# Patient Record
Sex: MALE | Race: WHITE | Employment: FULL TIME | URBAN - METROPOLITAN AREA
[De-identification: names, ages, dates, MRNs, and addresses within clinical notes are randomized per-mention and may not be internally consistent; named-entity substitution may affect disease eponyms.]

---

## 2023-04-22 ENCOUNTER — APPOINTMENT (OUTPATIENT)
Dept: GENERAL RADIOLOGY | Age: 48
End: 2023-04-22
Payer: COMMERCIAL

## 2023-04-22 ENCOUNTER — HOSPITAL ENCOUNTER (OUTPATIENT)
Age: 48
Setting detail: OBSERVATION
Discharge: HOME | End: 2023-04-24
Attending: EMERGENCY MEDICINE | Admitting: EMERGENCY MEDICINE
Payer: COMMERCIAL

## 2023-04-22 DIAGNOSIS — R07.9 CHEST PAIN, UNSPECIFIED TYPE: Primary | ICD-10-CM

## 2023-04-22 LAB
ABSOLUTE EOS #: 0.05 K/UL (ref 0–0.44)
ABSOLUTE IMMATURE GRANULOCYTE: <0.03 K/UL (ref 0–0.3)
ABSOLUTE LYMPH #: 2.1 K/UL (ref 1.1–3.7)
ABSOLUTE MONO #: 0.53 K/UL (ref 0.1–1.2)
ANION GAP SERPL CALCULATED.3IONS-SCNC: 16 MMOL/L (ref 9–17)
ANION GAP: 13 MMOL/L (ref 7–16)
BASOPHILS # BLD: 1 % (ref 0–2)
BASOPHILS ABSOLUTE: 0.05 K/UL (ref 0–0.2)
BNP SERPL-MCNC: 57 PG/ML
BUN SERPL-MCNC: 13 MG/DL (ref 6–20)
CALCIUM SERPL-MCNC: 9.7 MG/DL (ref 8.6–10.4)
CHLORIDE SERPL-SCNC: 110 MMOL/L (ref 98–107)
CO2 SERPL-SCNC: 19 MMOL/L (ref 20–31)
CREAT SERPL-MCNC: 0.82 MG/DL (ref 0.7–1.2)
D DIMER BLD IA.RAPID-MCNC: <0.27 UG/ML FEU (ref 0–0.57)
EGFR, POC: >60 ML/MIN/1.73M2
EOSINOPHILS RELATIVE PERCENT: 1 % (ref 1–4)
GFR SERPL CREATININE-BSD FRML MDRD: >60 ML/MIN/1.73M2
GLUCOSE BLD-MCNC: 116 MG/DL (ref 74–100)
GLUCOSE SERPL-MCNC: 110 MG/DL (ref 70–99)
HCO3 VENOUS: 26.4 MMOL/L (ref 22–29)
HCT VFR BLD AUTO: 45 % (ref 40.7–50.3)
HGB BLD-MCNC: 15.3 G/DL (ref 13–17)
IMMATURE GRANULOCYTES: 0 %
LYMPHOCYTES # BLD: 32 % (ref 24–43)
MAGNESIUM SERPL-MCNC: 1.9 MG/DL (ref 1.6–2.6)
MCH RBC QN AUTO: 31 PG (ref 25.2–33.5)
MCHC RBC AUTO-ENTMCNC: 34 G/DL (ref 28.4–34.8)
MCV RBC AUTO: 91.1 FL (ref 82.6–102.9)
MONOCYTES # BLD: 8 % (ref 3–12)
NRBC AUTOMATED: 0 PER 100 WBC
O2 SAT, VEN: 83 % (ref 60–85)
PCO2, VEN: 37.5 MM HG (ref 41–51)
PDW BLD-RTO: 11.9 % (ref 11.8–14.4)
PH VENOUS: 7.46 (ref 7.32–7.43)
PLATELET # BLD AUTO: 335 K/UL (ref 138–453)
PMV BLD AUTO: 9.1 FL (ref 8.1–13.5)
PO2, VEN: 45.2 MM HG (ref 30–50)
POC BUN: 12 MG/DL (ref 8–26)
POC CHLORIDE: 110 MMOL/L (ref 98–107)
POC CREATININE: 0.77 MG/DL (ref 0.51–1.19)
POC HEMATOCRIT: 48 % (ref 41–53)
POC HEMOGLOBIN: 16.4 G/DL (ref 13.5–17.5)
POC IONIZED CALCIUM: 1.24 MMOL/L (ref 1.15–1.33)
POC LACTIC ACID: 1.89 MMOL/L (ref 0.56–1.39)
POC POTASSIUM: 3.9 MMOL/L (ref 3.5–4.5)
POC SODIUM: 147 MMOL/L (ref 138–146)
POC TCO2: 26 MMOL/L (ref 22–30)
POSITIVE BASE EXCESS, VEN: 2 (ref 0–3)
POTASSIUM SERPL-SCNC: 4.1 MMOL/L (ref 3.7–5.3)
RBC # BLD: 4.94 M/UL (ref 4.21–5.77)
SARS-COV-2 RDRP RESP QL NAA+PROBE: NOT DETECTED
SEG NEUTROPHILS: 58 % (ref 36–65)
SEGMENTED NEUTROPHILS ABSOLUTE COUNT: 3.88 K/UL (ref 1.5–8.1)
SODIUM SERPL-SCNC: 145 MMOL/L (ref 135–144)
SPECIMEN DESCRIPTION: NORMAL
TROPONIN I SERPL DL<=0.01 NG/ML-MCNC: 6 NG/L (ref 0–22)
TROPONIN I SERPL DL<=0.01 NG/ML-MCNC: <6 NG/L (ref 0–22)
TSH SERPL-ACNC: 0.62 UIU/ML (ref 0.3–5)
WBC # BLD AUTO: 6.6 K/UL (ref 3.5–11.3)

## 2023-04-22 PROCEDURE — 96361 HYDRATE IV INFUSION ADD-ON: CPT

## 2023-04-22 PROCEDURE — 82803 BLOOD GASES ANY COMBINATION: CPT

## 2023-04-22 PROCEDURE — 2580000003 HC RX 258: Performed by: STUDENT IN AN ORGANIZED HEALTH CARE EDUCATION/TRAINING PROGRAM

## 2023-04-22 PROCEDURE — 80051 ELECTROLYTE PANEL: CPT

## 2023-04-22 PROCEDURE — 84443 ASSAY THYROID STIM HORMONE: CPT

## 2023-04-22 PROCEDURE — 85379 FIBRIN DEGRADATION QUANT: CPT

## 2023-04-22 PROCEDURE — 6360000002 HC RX W HCPCS: Performed by: STUDENT IN AN ORGANIZED HEALTH CARE EDUCATION/TRAINING PROGRAM

## 2023-04-22 PROCEDURE — 84520 ASSAY OF UREA NITROGEN: CPT

## 2023-04-22 PROCEDURE — 85025 COMPLETE CBC W/AUTO DIFF WBC: CPT

## 2023-04-22 PROCEDURE — 87635 SARS-COV-2 COVID-19 AMP PRB: CPT

## 2023-04-22 PROCEDURE — 83735 ASSAY OF MAGNESIUM: CPT

## 2023-04-22 PROCEDURE — 71045 X-RAY EXAM CHEST 1 VIEW: CPT

## 2023-04-22 PROCEDURE — 82565 ASSAY OF CREATININE: CPT

## 2023-04-22 PROCEDURE — G0378 HOSPITAL OBSERVATION PER HR: HCPCS

## 2023-04-22 PROCEDURE — 83880 ASSAY OF NATRIURETIC PEPTIDE: CPT

## 2023-04-22 PROCEDURE — 93005 ELECTROCARDIOGRAM TRACING: CPT | Performed by: STUDENT IN AN ORGANIZED HEALTH CARE EDUCATION/TRAINING PROGRAM

## 2023-04-22 PROCEDURE — 6370000000 HC RX 637 (ALT 250 FOR IP): Performed by: STUDENT IN AN ORGANIZED HEALTH CARE EDUCATION/TRAINING PROGRAM

## 2023-04-22 PROCEDURE — 83605 ASSAY OF LACTIC ACID: CPT

## 2023-04-22 PROCEDURE — 93005 ELECTROCARDIOGRAM TRACING: CPT | Performed by: EMERGENCY MEDICINE

## 2023-04-22 PROCEDURE — 85014 HEMATOCRIT: CPT

## 2023-04-22 PROCEDURE — 96374 THER/PROPH/DIAG INJ IV PUSH: CPT

## 2023-04-22 PROCEDURE — 82947 ASSAY GLUCOSE BLOOD QUANT: CPT

## 2023-04-22 PROCEDURE — 82330 ASSAY OF CALCIUM: CPT

## 2023-04-22 PROCEDURE — 80048 BASIC METABOLIC PNL TOTAL CA: CPT

## 2023-04-22 PROCEDURE — 99285 EMERGENCY DEPT VISIT HI MDM: CPT

## 2023-04-22 PROCEDURE — 84484 ASSAY OF TROPONIN QUANT: CPT

## 2023-04-22 RX ORDER — LOSARTAN POTASSIUM 50 MG/1
100 TABLET ORAL DAILY
Status: DISCONTINUED | OUTPATIENT
Start: 2023-04-23 | End: 2023-04-24 | Stop reason: HOSPADM

## 2023-04-22 RX ORDER — ONDANSETRON 2 MG/ML
4 INJECTION INTRAMUSCULAR; INTRAVENOUS EVERY 4 HOURS PRN
Status: DISCONTINUED | OUTPATIENT
Start: 2023-04-22 | End: 2023-04-24 | Stop reason: HOSPADM

## 2023-04-22 RX ORDER — ACETAMINOPHEN 500 MG
1000 TABLET ORAL EVERY 8 HOURS PRN
Status: DISCONTINUED | OUTPATIENT
Start: 2023-04-22 | End: 2023-04-24 | Stop reason: HOSPADM

## 2023-04-22 RX ORDER — ACETAMINOPHEN 650 MG/1
650 SUPPOSITORY RECTAL EVERY 6 HOURS PRN
Status: DISCONTINUED | OUTPATIENT
Start: 2023-04-22 | End: 2023-04-24 | Stop reason: HOSPADM

## 2023-04-22 RX ORDER — ACETAMINOPHEN 325 MG/1
650 TABLET ORAL EVERY 6 HOURS PRN
Status: DISCONTINUED | OUTPATIENT
Start: 2023-04-22 | End: 2023-04-24 | Stop reason: HOSPADM

## 2023-04-22 RX ORDER — SODIUM CHLORIDE 9 MG/ML
INJECTION, SOLUTION INTRAVENOUS CONTINUOUS
Status: DISCONTINUED | OUTPATIENT
Start: 2023-04-22 | End: 2023-04-24 | Stop reason: HOSPADM

## 2023-04-22 RX ORDER — SODIUM CHLORIDE 0.9 % (FLUSH) 0.9 %
5-40 SYRINGE (ML) INJECTION EVERY 12 HOURS SCHEDULED
Status: DISCONTINUED | OUTPATIENT
Start: 2023-04-22 | End: 2023-04-24 | Stop reason: HOSPADM

## 2023-04-22 RX ORDER — SODIUM CHLORIDE 0.9 % (FLUSH) 0.9 %
5-40 SYRINGE (ML) INJECTION PRN
Status: DISCONTINUED | OUTPATIENT
Start: 2023-04-22 | End: 2023-04-24 | Stop reason: HOSPADM

## 2023-04-22 RX ORDER — LOSARTAN POTASSIUM 100 MG/1
100 TABLET ORAL DAILY
COMMUNITY

## 2023-04-22 RX ORDER — IBUPROFEN 800 MG/1
800 TABLET ORAL EVERY 8 HOURS PRN
Status: DISCONTINUED | OUTPATIENT
Start: 2023-04-22 | End: 2023-04-24 | Stop reason: HOSPADM

## 2023-04-22 RX ORDER — POTASSIUM CHLORIDE 7.45 MG/ML
10 INJECTION INTRAVENOUS PRN
Status: DISCONTINUED | OUTPATIENT
Start: 2023-04-22 | End: 2023-04-24 | Stop reason: HOSPADM

## 2023-04-22 RX ORDER — POLYETHYLENE GLYCOL 3350 17 G/17G
17 POWDER, FOR SOLUTION ORAL DAILY PRN
Status: DISCONTINUED | OUTPATIENT
Start: 2023-04-22 | End: 2023-04-24 | Stop reason: HOSPADM

## 2023-04-22 RX ORDER — NITROGLYCERIN 0.4 MG/1
0.4 TABLET SUBLINGUAL ONCE
Status: COMPLETED | OUTPATIENT
Start: 2023-04-22 | End: 2023-04-22

## 2023-04-22 RX ORDER — 0.9 % SODIUM CHLORIDE 0.9 %
1000 INTRAVENOUS SOLUTION INTRAVENOUS ONCE
Status: COMPLETED | OUTPATIENT
Start: 2023-04-22 | End: 2023-04-22

## 2023-04-22 RX ORDER — SODIUM CHLORIDE 9 MG/ML
25 INJECTION, SOLUTION INTRAVENOUS PRN
Status: DISCONTINUED | OUTPATIENT
Start: 2023-04-22 | End: 2023-04-24 | Stop reason: HOSPADM

## 2023-04-22 RX ORDER — ONDANSETRON 2 MG/ML
4 INJECTION INTRAMUSCULAR; INTRAVENOUS ONCE
Status: COMPLETED | OUTPATIENT
Start: 2023-04-22 | End: 2023-04-22

## 2023-04-22 RX ORDER — ENOXAPARIN SODIUM 100 MG/ML
40 INJECTION SUBCUTANEOUS DAILY
Status: DISCONTINUED | OUTPATIENT
Start: 2023-04-22 | End: 2023-04-24 | Stop reason: HOSPADM

## 2023-04-22 RX ORDER — POTASSIUM CHLORIDE 20 MEQ/1
40 TABLET, EXTENDED RELEASE ORAL PRN
Status: DISCONTINUED | OUTPATIENT
Start: 2023-04-22 | End: 2023-04-24 | Stop reason: HOSPADM

## 2023-04-22 RX ADMIN — ONDANSETRON 4 MG: 2 INJECTION INTRAMUSCULAR; INTRAVENOUS at 14:49

## 2023-04-22 RX ADMIN — NITROGLYCERIN 0.4 MG: 0.4 TABLET SUBLINGUAL at 14:58

## 2023-04-22 RX ADMIN — SODIUM CHLORIDE 1000 ML: 9 INJECTION, SOLUTION INTRAVENOUS at 15:02

## 2023-04-22 RX ADMIN — SODIUM CHLORIDE, PRESERVATIVE FREE 10 ML: 5 INJECTION INTRAVENOUS at 21:00

## 2023-04-22 ASSESSMENT — ENCOUNTER SYMPTOMS
CHEST TIGHTNESS: 0
DIARRHEA: 0
TROUBLE SWALLOWING: 0
ABDOMINAL PAIN: 0
VOMITING: 0
BACK PAIN: 0
CONSTIPATION: 0
NAUSEA: 1
COLOR CHANGE: 0
COUGH: 0
SHORTNESS OF BREATH: 0

## 2023-04-22 ASSESSMENT — HEART SCORE: ECG: 1

## 2023-04-22 ASSESSMENT — PAIN SCALES - WONG BAKER: WONGBAKER_NUMERICALRESPONSE: 0

## 2023-04-22 ASSESSMENT — PAIN - FUNCTIONAL ASSESSMENT: PAIN_FUNCTIONAL_ASSESSMENT: NONE - DENIES PAIN

## 2023-04-23 LAB
CHOLEST SERPL-MCNC: 187 MG/DL
CHOLESTEROL/HDL RATIO: 4.3
HDLC SERPL-MCNC: 43 MG/DL
LDLC SERPL CALC-MCNC: 114 MG/DL (ref 0–130)
TRIGL SERPL-MCNC: 150 MG/DL

## 2023-04-23 PROCEDURE — 2580000003 HC RX 258: Performed by: STUDENT IN AN ORGANIZED HEALTH CARE EDUCATION/TRAINING PROGRAM

## 2023-04-23 PROCEDURE — 80061 LIPID PANEL: CPT

## 2023-04-23 PROCEDURE — 93005 ELECTROCARDIOGRAM TRACING: CPT | Performed by: STUDENT IN AN ORGANIZED HEALTH CARE EDUCATION/TRAINING PROGRAM

## 2023-04-23 PROCEDURE — 36415 COLL VENOUS BLD VENIPUNCTURE: CPT

## 2023-04-23 PROCEDURE — 6370000000 HC RX 637 (ALT 250 FOR IP): Performed by: STUDENT IN AN ORGANIZED HEALTH CARE EDUCATION/TRAINING PROGRAM

## 2023-04-23 PROCEDURE — G0378 HOSPITAL OBSERVATION PER HR: HCPCS

## 2023-04-23 PROCEDURE — 83036 HEMOGLOBIN GLYCOSYLATED A1C: CPT

## 2023-04-23 RX ORDER — CARVEDILOL 6.25 MG/1
6.25 TABLET ORAL 2 TIMES DAILY WITH MEALS
Status: DISCONTINUED | OUTPATIENT
Start: 2023-04-23 | End: 2023-04-24 | Stop reason: HOSPADM

## 2023-04-23 RX ADMIN — CARVEDILOL 6.25 MG: 6.25 TABLET, FILM COATED ORAL at 09:27

## 2023-04-23 RX ADMIN — CARVEDILOL 6.25 MG: 6.25 TABLET, FILM COATED ORAL at 18:00

## 2023-04-23 RX ADMIN — LOSARTAN POTASSIUM 100 MG: 50 TABLET, FILM COATED ORAL at 09:27

## 2023-04-23 RX ADMIN — SODIUM CHLORIDE, PRESERVATIVE FREE 10 ML: 5 INJECTION INTRAVENOUS at 09:27

## 2023-04-23 RX ADMIN — SODIUM CHLORIDE, PRESERVATIVE FREE 10 ML: 5 INJECTION INTRAVENOUS at 22:07

## 2023-04-23 ASSESSMENT — PAIN SCALES - WONG BAKER

## 2023-04-23 ASSESSMENT — HEART SCORE: ECG: 1

## 2023-04-24 ENCOUNTER — APPOINTMENT (OUTPATIENT)
Dept: NUCLEAR MEDICINE | Age: 48
End: 2023-04-24
Payer: COMMERCIAL

## 2023-04-24 VITALS
DIASTOLIC BLOOD PRESSURE: 104 MMHG | HEART RATE: 59 BPM | OXYGEN SATURATION: 95 % | RESPIRATION RATE: 16 BRPM | WEIGHT: 190 LBS | TEMPERATURE: 97.3 F | HEIGHT: 66 IN | SYSTOLIC BLOOD PRESSURE: 169 MMHG | BODY MASS INDEX: 30.53 KG/M2

## 2023-04-24 LAB
EKG ATRIAL RATE: 60 BPM
EKG ATRIAL RATE: 93 BPM
EKG P AXIS: 49 DEGREES
EKG P AXIS: 67 DEGREES
EKG P-R INTERVAL: 168 MS
EKG P-R INTERVAL: 194 MS
EKG Q-T INTERVAL: 354 MS
EKG Q-T INTERVAL: 416 MS
EKG QRS DURATION: 74 MS
EKG QRS DURATION: 86 MS
EKG QTC CALCULATION (BAZETT): 416 MS
EKG QTC CALCULATION (BAZETT): 440 MS
EKG R AXIS: -14 DEGREES
EKG R AXIS: 25 DEGREES
EKG T AXIS: 19 DEGREES
EKG T AXIS: 34 DEGREES
EKG VENTRICULAR RATE: 60 BPM
EKG VENTRICULAR RATE: 93 BPM
LV EF: 48 %
LVEF MODALITY: NORMAL

## 2023-04-24 PROCEDURE — 2580000003 HC RX 258: Performed by: STUDENT IN AN ORGANIZED HEALTH CARE EDUCATION/TRAINING PROGRAM

## 2023-04-24 PROCEDURE — 6370000000 HC RX 637 (ALT 250 FOR IP): Performed by: STUDENT IN AN ORGANIZED HEALTH CARE EDUCATION/TRAINING PROGRAM

## 2023-04-24 PROCEDURE — G0378 HOSPITAL OBSERVATION PER HR: HCPCS

## 2023-04-24 PROCEDURE — 93010 ELECTROCARDIOGRAM REPORT: CPT | Performed by: INTERNAL MEDICINE

## 2023-04-24 PROCEDURE — 93017 CV STRESS TEST TRACING ONLY: CPT

## 2023-04-24 PROCEDURE — 3430000000 HC RX DIAGNOSTIC RADIOPHARMACEUTICAL: Performed by: STUDENT IN AN ORGANIZED HEALTH CARE EDUCATION/TRAINING PROGRAM

## 2023-04-24 PROCEDURE — 78452 HT MUSCLE IMAGE SPECT MULT: CPT

## 2023-04-24 PROCEDURE — A9500 TC99M SESTAMIBI: HCPCS | Performed by: STUDENT IN AN ORGANIZED HEALTH CARE EDUCATION/TRAINING PROGRAM

## 2023-04-24 PROCEDURE — 6360000002 HC RX W HCPCS: Performed by: STUDENT IN AN ORGANIZED HEALTH CARE EDUCATION/TRAINING PROGRAM

## 2023-04-24 RX ORDER — AMLODIPINE BESYLATE 2.5 MG/1
2.5 TABLET ORAL DAILY
Qty: 30 TABLET | Refills: 3 | Status: SHIPPED | OUTPATIENT
Start: 2023-04-24

## 2023-04-24 RX ORDER — METOPROLOL TARTRATE 5 MG/5ML
5 INJECTION INTRAVENOUS EVERY 5 MIN PRN
Status: DISCONTINUED | OUTPATIENT
Start: 2023-04-24 | End: 2023-04-24 | Stop reason: ALTCHOICE

## 2023-04-24 RX ORDER — ALBUTEROL SULFATE 90 UG/1
2 AEROSOL, METERED RESPIRATORY (INHALATION) PRN
Status: DISCONTINUED | OUTPATIENT
Start: 2023-04-24 | End: 2023-04-24 | Stop reason: ALTCHOICE

## 2023-04-24 RX ORDER — AMLODIPINE BESYLATE 2.5 MG/1
2.5 TABLET ORAL DAILY
Status: DISCONTINUED | OUTPATIENT
Start: 2023-04-24 | End: 2023-04-24 | Stop reason: HOSPADM

## 2023-04-24 RX ORDER — TETRAKIS(2-METHOXYISOBUTYLISOCYANIDE)COPPER(I) TETRAFLUOROBORATE 1 MG/ML
14.7 INJECTION, POWDER, LYOPHILIZED, FOR SOLUTION INTRAVENOUS
Status: COMPLETED | OUTPATIENT
Start: 2023-04-24 | End: 2023-04-24

## 2023-04-24 RX ORDER — TETRAKIS(2-METHOXYISOBUTYLISOCYANIDE)COPPER(I) TETRAFLUOROBORATE 1 MG/ML
41 INJECTION, POWDER, LYOPHILIZED, FOR SOLUTION INTRAVENOUS
Status: COMPLETED | OUTPATIENT
Start: 2023-04-24 | End: 2023-04-24

## 2023-04-24 RX ORDER — SODIUM CHLORIDE 0.9 % (FLUSH) 0.9 %
10 SYRINGE (ML) INJECTION PRN
Status: DISCONTINUED | OUTPATIENT
Start: 2023-04-24 | End: 2023-04-24 | Stop reason: HOSPADM

## 2023-04-24 RX ORDER — ATROPINE SULFATE 0.1 MG/ML
0.5 INJECTION INTRAVENOUS EVERY 5 MIN PRN
Status: DISCONTINUED | OUTPATIENT
Start: 2023-04-24 | End: 2023-04-24 | Stop reason: ALTCHOICE

## 2023-04-24 RX ORDER — NITROGLYCERIN 0.4 MG/1
0.4 TABLET SUBLINGUAL EVERY 5 MIN PRN
Status: DISCONTINUED | OUTPATIENT
Start: 2023-04-24 | End: 2023-04-24 | Stop reason: ALTCHOICE

## 2023-04-24 RX ORDER — SODIUM CHLORIDE 0.9 % (FLUSH) 0.9 %
5-40 SYRINGE (ML) INJECTION PRN
Status: DISCONTINUED | OUTPATIENT
Start: 2023-04-24 | End: 2023-04-24 | Stop reason: ALTCHOICE

## 2023-04-24 RX ORDER — AMINOPHYLLINE DIHYDRATE 25 MG/ML
50 INJECTION, SOLUTION INTRAVENOUS PRN
Status: DISCONTINUED | OUTPATIENT
Start: 2023-04-24 | End: 2023-04-24 | Stop reason: ALTCHOICE

## 2023-04-24 RX ORDER — CARVEDILOL 6.25 MG/1
6.25 TABLET ORAL 2 TIMES DAILY WITH MEALS
Qty: 60 TABLET | Refills: 3 | Status: SHIPPED | OUTPATIENT
Start: 2023-04-24

## 2023-04-24 RX ORDER — SODIUM CHLORIDE 9 MG/ML
500 INJECTION, SOLUTION INTRAVENOUS CONTINUOUS PRN
Status: DISCONTINUED | OUTPATIENT
Start: 2023-04-24 | End: 2023-04-24 | Stop reason: ALTCHOICE

## 2023-04-24 RX ORDER — AMLODIPINE BESYLATE 2.5 MG/1
2.5 TABLET ORAL DAILY
Qty: 30 TABLET | Refills: 3 | Status: SHIPPED | OUTPATIENT
Start: 2023-04-24 | End: 2023-04-24 | Stop reason: SDUPTHER

## 2023-04-24 RX ADMIN — SODIUM CHLORIDE, PRESERVATIVE FREE 10 ML: 5 INJECTION INTRAVENOUS at 09:39

## 2023-04-24 RX ADMIN — SODIUM CHLORIDE, PRESERVATIVE FREE 10 ML: 5 INJECTION INTRAVENOUS at 09:13

## 2023-04-24 RX ADMIN — TETRAKIS(2-METHOXYISOBUTYLISOCYANIDE)COPPER(I) TETRAFLUOROBORATE 41 MILLICURIE: 1 INJECTION, POWDER, LYOPHILIZED, FOR SOLUTION INTRAVENOUS at 10:09

## 2023-04-24 RX ADMIN — CARVEDILOL 6.25 MG: 6.25 TABLET, FILM COATED ORAL at 09:11

## 2023-04-24 RX ADMIN — LOSARTAN POTASSIUM 100 MG: 50 TABLET, FILM COATED ORAL at 09:11

## 2023-04-24 RX ADMIN — REGADENOSON 0.4 MG: 0.08 INJECTION, SOLUTION INTRAVENOUS at 10:07

## 2023-04-24 RX ADMIN — SODIUM CHLORIDE, PRESERVATIVE FREE 10 ML: 5 INJECTION INTRAVENOUS at 10:09

## 2023-04-24 RX ADMIN — SODIUM CHLORIDE, PRESERVATIVE FREE 10 ML: 5 INJECTION INTRAVENOUS at 08:30

## 2023-04-24 RX ADMIN — SODIUM CHLORIDE, PRESERVATIVE FREE 10 ML: 5 INJECTION INTRAVENOUS at 10:07

## 2023-04-24 RX ADMIN — TETRAKIS(2-METHOXYISOBUTYLISOCYANIDE)COPPER(I) TETRAFLUOROBORATE 14.7 MILLICURIE: 1 INJECTION, POWDER, LYOPHILIZED, FOR SOLUTION INTRAVENOUS at 10:13

## 2023-04-24 ASSESSMENT — PAIN SCALES - WONG BAKER

## 2023-04-24 NOTE — PROGRESS NOTES
OBS/CDU   RESIDENT NOTE      Patients PCP is: No primary care provider on file. SUBJECTIVE      No acute events overnight. Has been able to tolerate a full diet without nausea or vomiting. The patient is urinating on his own and is passing flatus. Denies fever, chills, nausea, vomiting, chest pain, shortness of breath, abdominal pain, focal weakness, numbness, tingling, urinary/bowel symptoms, vision changes, visual hallucinations, or headache. PHYSICAL EXAM      General: NAD, AO X 3  Heent: EMOI, PERRL  Neck: SUPPLE, NO JVD  Cardiovascular: RRR, S1S2  Pulmonary: CTAB, NO SOB  Abdomen: SOFT, NTTP, ND, +BS  Extremities: +2/4 PULSES DISTAL, NO SWELLING  Neuro / Psych: NO NUMBNESS OR TINGLING, MENTATION AT BASELINE    PERTINENT TEST /EXAMS      I have reviewed all available laboratory results. MEDICATIONS CURRENT   carvedilol (COREG) tablet 6.25 mg, BID WC  sodium chloride flush 0.9 % injection 5-40 mL, 2 times per day  sodium chloride flush 0.9 % injection 5-40 mL, PRN  0.9 % sodium chloride infusion, PRN  potassium chloride (KLOR-CON M) extended release tablet 40 mEq, PRN   Or  potassium bicarb-citric acid (EFFER-K) effervescent tablet 40 mEq, PRN   Or  potassium chloride 10 mEq/100 mL IVPB (Peripheral Line), PRN  enoxaparin (LOVENOX) injection 40 mg, Daily  ondansetron (ZOFRAN) injection 4 mg, Q4H PRN  polyethylene glycol (GLYCOLAX) packet 17 g, Daily PRN  acetaminophen (TYLENOL) tablet 650 mg, Q6H PRN   Or  acetaminophen (TYLENOL) suppository 650 mg, Q6H PRN  0.9 % sodium chloride infusion, Continuous  ibuprofen (ADVIL;MOTRIN) tablet 800 mg, Q8H PRN  acetaminophen (TYLENOL) tablet 1,000 mg, Q8H PRN  losartan (COZAAR) tablet 100 mg, Daily        All medication charted and reviewed.     CONSULTS      IP CONSULT TO CARDIOLOGY    ASSESSMENT/PLAN       Keshawn Dale is a 52 y.o. male who presents with chest pain and is admitted for cardiology evaluation    Chest pain  Cardiac evaluation appreciate

## 2023-04-24 NOTE — DISCHARGE INSTR - COC
Continuity of Care Form    Patient Name: Aleksandar Maradiaga   :  1975  MRN:  [id number]    Admit date:  2023  Discharge date:  ***    Code Status Order: Full Code   Advance Directives:     Admitting Physician:  Tylor Lancaster MD  PCP: No primary care provider on file. Discharging Nurse: [facility] Unit/Room#: 6948/5745-48  Discharging Unit Phone Number: ***    Emergency Contact:   Extended Emergency Contact Information  Primary Emergency Contact: [address] Phone: [phone]  Relation: Parent  Secondary Emergency Contact: sumaya Howell  Home Phone: [phone]  Relation: Parent    Past Surgical History:  History reviewed. No pertinent surgical history. Immunization History: There is no immunization history on file for this patient. Active Problems:  Patient Active Problem List   Diagnosis Code    Chest pain R07.9       Isolation/Infection:   Isolation            No Isolation          Patient Infection Status       None to display            Nurse Assessment:  Last Vital Signs: BP (!) 168/102   Pulse 57   Temp 98.1 °F (36.7 °C) (Oral)   Resp 16   Ht 5' 6\" (1.676 m)   Wt 190 lb (86.2 kg)   SpO2 96%   BMI 30.67 kg/m²     Last documented pain score (0-10 scale):    Last Weight:   Wt Readings from Last 1 Encounters:   23 190 lb (86.2 kg)     Mental Status:  {IP PT MENTAL STATUS:}    IV Access:  { HIEN IV ACCESS:677041179}    Nursing Mobility/ADLs:  Walking   {P DME QEZK:120372257}  Transfer  {CHP DME ZPIH:416946804}  Bathing  {CHP DME EOCP:396191691}  Dressing  {CHP DME BMJE:754582128}  Toileting  {CHP DME ONMZ:806224462}  Feeding  {CHP DME BMFH:079950442}  Med Admin  {CHP DME SHDU:154273098}  Med Delivery   { HIEN MED Delivery:386528391}    Wound Care Documentation and Therapy:        Elimination:  Continence:    Bowel: {YES / TR:86557}  Bladder: {YES / PM:13705}  Urinary Catheter: {Urinary Catheter:277415728}   Colostomy/Ileostomy/Ileal Conduit: {YES /

## 2023-04-24 NOTE — PROCEDURES
[facility]                              CARDIAC STRESS TEST    PATIENT NAME: Cristiane Dodge                      :        1975  MED REC NO:   [id number]                             ROOM:       1155  ACCOUNT NO:   [de-identified]                           ADMIT DATE: 2023  PROVIDER:     Aaron Meyer    DATE OF STUDY:  2023  Ordering Provider: Marc Salazar MD    Primary Care Provider: None    Interpreting Physician: ISAEL Kam MD  Freitas Layer Stress Study    Indication: Chest Pain  Procedure Explained and Consent Signed    Medication: 0.4 mg  Resting Heart Rate: 72   BPM    Resting Blood Pressure:  167/104      mm/Hg    Infusion Blood Pressure:  180/114    mm/Hg    Resting EKG: Normal, SR  Stress Heart Rate Response: Normal response  Stress BP Response: Appropriate  Stress EKG: Normal    Chest Discomfort: No pain during and after recovery    Ischemic EKG Changes: None    IMPRESSION: Negative    Electronically  Negative Lexiscan Stress Study. Radio-isotope results to  follow from the Department of Nuclear Medicine.       Vasu Kilgore    D: 2023 14:00:13       T: 2023 14:01:42     AS/MARCELLA  Job#: [id number]     Doc#: Unknown    CC:

## 2023-04-24 NOTE — PROGRESS NOTES
[facility]  CDU / OBSERVATION ENCOUNTER  ATTENDING NOTE       I performed a history and physical examination of the patient and discussed management with the resident or midlevel provider. I reviewed the resident or midlevel provider's note and agree with the documented findings and plan of care. Any areas of disagreement are noted on the chart. I was personally present for the key portions of any procedures. I have documented in the chart those procedures where I was not present during the key portions. I have reviewed the nurses notes. I agree with the chief complaint, past medical history, past surgical history, allergies, medications, social and family history as documented unless otherwise noted below. The Family history, social history, and ROS are effectively unchanged since admission unless noted elsewhere in the chart. This patient was placed in the observation unit for reevaluation for possible admission to the hospital    The patient is a 49-year-old male who presents for evaluation of chest pain. He had an episode of tachycardia which has resolved. ED work-up has been grossly unremarkable. Cardiology evaluated him and ordered a stress test which is still pending. If stress test is negative then plan for discharge. If there are are any abnormalities on the stress test we will discuss with cardiology. At this time the patient is chest pain-free. Heart regular rate and rhythm. Lungs clear to auscultation. Radial pulses stable/morning bilaterally. He states he overall feels improved and would like to be discharged if possible.     [address], [address] [facility],[address]  Attending Emergency  Physician

## 2023-04-24 NOTE — DISCHARGE SUMMARY
CDU Discharge Summary        Patient:  Edgardo Joseph  YOB: 1975    MRN: [id number]   Acct: [de-identified]    Primary Care Physician: No primary care provider on file. Admit date:  4/22/2023  2:34 PM  Discharge date:  4/24/2023    Discharge Diagnoses:     Acute chest pain due to none ELSY  Improved with analgesia    Follow-up:  Call today/tomorrow for a follow up appointment with No primary care provider on file. , or return to the Emergency Room with worsening symptoms    Stressed to patient the importance of following up with primary care doctor for further workup/management of symptoms. Pt verbalizes understanding and agrees with plan. Discharge Medications:  Changes to medications Norvasc was added to his blood pressure management          Medication List        ASK your doctor about these medications      losartan 100 MG tablet  Commonly known as: COZAAR              Diet:  ADULT DIET; Regular , Advance as tolerated     Activity:  As tolerated    Consultants: IP CONSULT TO CARDIOLOGY    Procedures:  Not indicated     Diagnostic Test:   Results for orders placed or performed during the hospital encounter of 04/22/23   COVID-19, Rapid    Specimen: Nasopharyngeal Swab   Result Value Ref Range    Specimen Description . NASOPHARYNGEAL SWAB     SARS-CoV-2, Rapid Not Detected Not Detected   CBC with Auto Differential   Result Value Ref Range    WBC 6.6 3.5 - 11.3 k/uL    RBC 4.94 4.21 - 5.77 m/uL    Hemoglobin 15.3 13.0 - 17.0 g/dL    Hematocrit 45.0 40.7 - 50.3 %    MCV 91.1 82.6 - 102.9 fL    MCH 31.0 25.2 - 33.5 pg    MCHC 34.0 28.4 - 34.8 g/dL    RDW 11.9 11.8 - 14.4 %    Platelets 411 968 - 759 k/uL    MPV 9.1 8.1 - 13.5 fL    NRBC Automated 0.0 0.0 per 100 WBC    Seg Neutrophils 58 36 - 65 %    Lymphocytes 32 24 - 43 %    Monocytes 8 3 - 12 %    Eosinophils % 1 1 - 4 %    Basophils 1 0 - 2 %    Immature Granulocytes 0 0 %    Segs Absolute 3.88 1.50 - 8.10 k/uL    Absolute Lymph # 2.10 1.10 - 3.70

## 2023-04-24 NOTE — PROGRESS NOTES
[facility]   Progress Note                   Date:   4/24/2023  Patient name: Aleksandar Maradiaga  Date of admission:  4/22/2023  2:34 PM  MRN:   [id number]  YOB: 1975  PCP: No primary care provider on file. Reason for Admission: Chest pain [R07.9]  Chest pain, unspecified type [R07.9]    Subjective:       Clinical Changes / Abnormalities: Pt seen and examined in the stress lab. Pt denies any CP or sob. Pt states that he has not had any pain since admitted. Hypertensive. Medications:   Scheduled Meds:   carvedilol  6.25 mg Oral BID WC    sodium chloride flush  5-40 mL IntraVENous 2 times per day    enoxaparin  40 mg SubCUTAneous Daily    losartan  100 mg Oral Daily     Continuous Infusions:   sodium chloride      sodium chloride       CBC:   Recent Labs     04/22/23  1437   WBC 6.6   HGB 15.3        BMP:    Recent Labs     04/22/23  1434 04/22/23  1437   NA  --  145*   K  --  4.1   CL  --  110*   CO2  --  19*   BUN  --  13   CREATININE 0.77 0.82   GLUCOSE  --  110*     Hepatic: No results for input(s): AST, ALT, ALB, BILITOT, ALKPHOS in the last 72 hours. Troponin:   Recent Labs     04/22/23  1437 04/22/23  1533   TROPHS <6 6     BNP: No results for input(s): BNP in the last 72 hours. Lipids:   Recent Labs     04/23/23  0629   CHOL 187   HDL 43     INR: No results for input(s): INR in the last 72 hours. April 23, 2023: According to ED note, ECG with ST depressions and concerns for SVT but they have lost the ECG in transition to obs floor and it is not in the system. Today's repeat EKG was normal sinus rate 60 bpm, no signs of ischemia, no ST depressions, no TWI    Objective:   Vitals: BP (!) 169/104   Pulse 59   Temp 97.3 °F (36.3 °C) (Oral)   Resp 16   Ht 5' 6\" (1.676 m)   Wt 190 lb (86.2 kg)   SpO2 95%   BMI 30.67 kg/m²   General appearance: alert and cooperative with exam  HEENT: Head: Normocephalic, no lesions, without obvious abnormality.   Neck: no JVD,

## 2023-04-25 ENCOUNTER — CARE COORDINATION (OUTPATIENT)
Dept: CASE MANAGEMENT | Age: 48
End: 2023-04-25

## 2023-04-25 LAB
EKG ATRIAL RATE: 312 BPM
EKG Q-T INTERVAL: 308 MS
EKG QRS DURATION: 88 MS
EKG QTC CALCULATION (BAZETT): 458 MS
EKG R AXIS: -4 DEGREES
EKG T AXIS: 35 DEGREES
EKG VENTRICULAR RATE: 133 BPM

## 2023-04-25 PROCEDURE — 93010 ELECTROCARDIOGRAM REPORT: CPT | Performed by: INTERNAL MEDICINE

## 2023-04-25 NOTE — CARE COORDINATION
[facility] Care Transitions Initial Follow Up Call    Call within 2 business days of discharge: Yes    Patient Current Location: [address] [place] [address] Transition Nurse contacted the patient by telephone to perform post hospital discharge assessment. Verified name and  with patient as identifiers. Provided introduction to self, and explanation of the Care Transition Nurse role. Patient: Earl Ibarra Patient : 1975   MRN: [id number]  Reason for Admission: Chest pain  Discharge Date: 23 RARS: No data recorded    Last Discharge  Street       Date Complaint Diagnosis Description Type Department Provider    23 Chest Pain Chest pain, unspecified type ED to Hosp-Admission (Discharged) (ADMITTED) [facility] Timur Mckay MD; Marcie Alvarez ... Was this an external facility discharge? No Discharge Facility: [facility]    Challenges to be reviewed by the provider   Additional needs identified to be addressed with provider: No  none               Method of communication with provider: none. 1st attempt to reach patient for Care Transitions. [facility] requesting return call. Contact information provided. [facility] returned call. He stated that he was doing \"good\". He denied any further chest pain and no shortness of breath. He currently was on his way to the pharmacy to get his medications. He said that he has an appointment with PCP, but it is not until 23. He said that he will be stopping a the [facility] and is hoping to make a sooner appointment there. He was told by the [facility] to go there with his discharge paper work. He had no further questions or   concerns. Received call from [facility]. He stated that he picked up one of his medications, but the Coreg was not sent to his pharmacy. Noted that medication was sent to [facility] out pt pharmacy. Pharmacy at [facility] stated that they had the script and that his pharmacy was to call for script transfer.   Call placed the [facility] and

## 2023-04-26 LAB
EST. AVERAGE GLUCOSE BLD GHB EST-MCNC: 100 MG/DL
HBA1C MFR BLD: 5.1 % (ref 4–6)

## 2023-05-17 RX ORDER — AMLODIPINE BESYLATE 2.5 MG/1
TABLET ORAL
Qty: 30 TABLET | Refills: 3 | OUTPATIENT
Start: 2023-05-17

## 2023-05-19 RX ORDER — CARVEDILOL 6.25 MG/1
TABLET ORAL
Qty: 60 TABLET | Refills: 3 | OUTPATIENT
Start: 2023-05-19

## 2023-08-26 ENCOUNTER — HOSPITAL ENCOUNTER (EMERGENCY)
Age: 48
Discharge: LEFT BEFORE DISCHARGE | End: 2023-08-26
Payer: SELF-PAY

## 2023-08-26 VITALS — SYSTOLIC BLOOD PRESSURE: 147 MMHG | DIASTOLIC BLOOD PRESSURE: 107 MMHG | HEART RATE: 111 BPM | RESPIRATION RATE: 24 BRPM

## 2023-08-26 VITALS — RESPIRATION RATE: 17 BRPM | HEART RATE: 118 BPM | OXYGEN SATURATION: 98 %

## 2023-08-26 VITALS — HEART RATE: 128 BPM | RESPIRATION RATE: 13 BRPM | OXYGEN SATURATION: 99 %

## 2023-08-26 VITALS — DIASTOLIC BLOOD PRESSURE: 134 MMHG | SYSTOLIC BLOOD PRESSURE: 176 MMHG

## 2023-08-26 VITALS — HEART RATE: 115 BPM | RESPIRATION RATE: 14 BRPM

## 2023-08-26 VITALS — RESPIRATION RATE: 27 BRPM | OXYGEN SATURATION: 88 % | HEART RATE: 135 BPM

## 2023-08-26 VITALS — SYSTOLIC BLOOD PRESSURE: 155 MMHG | DIASTOLIC BLOOD PRESSURE: 105 MMHG | RESPIRATION RATE: 15 BRPM | HEART RATE: 124 BPM

## 2023-08-26 VITALS — DIASTOLIC BLOOD PRESSURE: 113 MMHG | HEART RATE: 125 BPM | RESPIRATION RATE: 18 BRPM | SYSTOLIC BLOOD PRESSURE: 136 MMHG

## 2023-08-26 VITALS — DIASTOLIC BLOOD PRESSURE: 114 MMHG | RESPIRATION RATE: 19 BRPM | HEART RATE: 112 BPM | SYSTOLIC BLOOD PRESSURE: 152 MMHG

## 2023-08-26 VITALS
RESPIRATION RATE: 16 BRPM | DIASTOLIC BLOOD PRESSURE: 110 MMHG | TEMPERATURE: 97.6 F | HEART RATE: 119 BPM | OXYGEN SATURATION: 98 % | SYSTOLIC BLOOD PRESSURE: 150 MMHG

## 2023-08-26 VITALS — RESPIRATION RATE: 17 BRPM | OXYGEN SATURATION: 100 % | HEART RATE: 114 BPM

## 2023-08-26 VITALS — RESPIRATION RATE: 19 BRPM | HEART RATE: 112 BPM | DIASTOLIC BLOOD PRESSURE: 102 MMHG | SYSTOLIC BLOOD PRESSURE: 143 MMHG

## 2023-08-26 VITALS — SYSTOLIC BLOOD PRESSURE: 150 MMHG | DIASTOLIC BLOOD PRESSURE: 110 MMHG

## 2023-08-26 VITALS — BODY MASS INDEX: 30.7 KG/M2

## 2023-08-26 VITALS — DIASTOLIC BLOOD PRESSURE: 107 MMHG | HEART RATE: 116 BPM | SYSTOLIC BLOOD PRESSURE: 142 MMHG | RESPIRATION RATE: 14 BRPM

## 2023-08-26 VITALS — DIASTOLIC BLOOD PRESSURE: 111 MMHG | HEART RATE: 114 BPM | RESPIRATION RATE: 22 BRPM | SYSTOLIC BLOOD PRESSURE: 147 MMHG

## 2023-08-26 VITALS — HEART RATE: 117 BPM | OXYGEN SATURATION: 100 % | RESPIRATION RATE: 13 BRPM

## 2023-08-26 VITALS — OXYGEN SATURATION: 100 %

## 2023-08-26 DIAGNOSIS — Z79.899: ICD-10-CM

## 2023-08-26 DIAGNOSIS — Z87.891: ICD-10-CM

## 2023-08-26 DIAGNOSIS — R00.0: ICD-10-CM

## 2023-08-26 DIAGNOSIS — R07.9: Primary | ICD-10-CM

## 2023-08-26 DIAGNOSIS — I10: ICD-10-CM

## 2023-08-26 DIAGNOSIS — R42: ICD-10-CM

## 2023-08-26 LAB
ADD MANUAL DIFF: NO
ANION GAP: 13.1
BLOOD UREA NITROGEN: 15 MG/DL (ref 7–18)
CALCIUM: 9.4 MG/DL (ref 8.5–10.1)
CARBON DIOXIDE: 25.5 MMOL/L (ref 21–32)
CHLORIDE: 104 MMOL/L (ref 98–107)
CO2 BLD-SCNC: 25.5 MMOL/L (ref 21–32)
ESTIMATED GFR (AFRICAN AMERICA: >60 (ref 60–?)
ESTIMATED GFR (NON-AFRICAN AME: >60 (ref 60–?)
GLUCOSE BLD-MCNC: 91 MG/DL (ref 74–106)
HCT VFR BLD CALC: 44.4 % (ref 42–54)
HEMATOCRIT: 44.4 % (ref 42–54)
HEMOGLOBIN: 15.8 G/DL (ref 14–18)
IMMATURE GRANULOCYTES ABS AUTO: 0.01 10^3/UL (ref 0–0.03)
IMMATURE GRANULOCYTES PCT AUTO: 0.1 % (ref 0–0.5)
LYMPHOCYTES  ABSOLUTE AUTO: 2.9 10^3/UL (ref 1.2–3.8)
MCV RBC: 89.2 FL (ref 80–94)
MEAN CORPUSCULAR HEMOGLOBIN: 31.7 PG (ref 25.9–34)
MEAN CORPUSCULAR HGB CONC: 35.6 G/DL (ref 29.9–35.2)
MEAN CORPUSCULAR VOLUME: 89.2 FL (ref 80–94)
PLATELET # BLD: 365 10^3/UL (ref 150–450)
PLATELET COUNT: 365 10^3/UL (ref 150–450)
POTASSIUM SERPLBLD-SCNC: 3.6 MMOL/L (ref 3.5–5.1)
POTASSIUM: 3.6 MMOL/L (ref 3.5–5.1)
RED BLOOD COUNT: 4.98 10^6/UL (ref 4.7–6.1)
SODIUM BLD-SCNC: 139 MMOL/L (ref 136–145)
SODIUM: 139 MMOL/L (ref 136–145)
WBC # BLD: 7.8 10^3/UL (ref 4–11)
WHITE BLOOD COUNT: 7.8 10^3/UL (ref 4–11)

## 2023-08-26 PROCEDURE — 96375 TX/PRO/DX INJ NEW DRUG ADDON: CPT

## 2023-08-26 PROCEDURE — 80048 BASIC METABOLIC PNL TOTAL CA: CPT

## 2023-08-26 PROCEDURE — 71045 X-RAY EXAM CHEST 1 VIEW: CPT

## 2023-08-26 PROCEDURE — 84484 ASSAY OF TROPONIN QUANT: CPT

## 2023-08-26 PROCEDURE — 93005 ELECTROCARDIOGRAM TRACING: CPT

## 2023-08-26 PROCEDURE — 85025 COMPLETE CBC W/AUTO DIFF WBC: CPT

## 2023-08-26 PROCEDURE — 85378 FIBRIN DEGRADE SEMIQUANT: CPT

## 2023-08-26 PROCEDURE — 99285 EMERGENCY DEPT VISIT HI MDM: CPT

## 2023-08-26 PROCEDURE — 96374 THER/PROPH/DIAG INJ IV PUSH: CPT

## 2023-08-26 PROCEDURE — 36415 COLL VENOUS BLD VENIPUNCTURE: CPT

## 2023-08-26 NOTE — ED_ITS
HPI - Chest Pain    
General    
Chief Complaint: Chest Pain    
Stated Complaint: CHEST PAIN    
Time Seen by Provider: 08/26/23 04:33    
Source: patient    
Mode of arrival: walk-in    
Limitations: no limitations    
History of Present Illness    
HPI narrative:     
history of hypertension. Tonight at work exposed to galvanized steel fumes.    
States a co worker was welding it and he could smell it. He developed chest   
tightness and felt light headed. Went to the break room and his chest tightness   
improved but he continued to feel light headed and did not feel safe driving   
.His supervisor drove him here.    
MD complaint: Reports chest heaviness    
Related Data    
                                Home Medications    
    
    
    
 Medication  Instructions  Recorded  Confirmed    
     
amlodipine 2.5 mg tablet 2.5 mg PO DAILY 08/26/23 08/26/23    
     
carvedilol 6.25 mg tablet 6.25 mg PO Q12H 08/26/23 08/26/23    
     
losartan 100 mg tablet 100 mg PO DAILY 08/26/23 08/26/23    
    
    
    
                                    Allergies    
    
    
    
Allergy/AdvReac Type Severity Reaction Status Date / Time    
     
No Known Drug Allergies Allergy   Verified 08/26/23 04:26    
    
    
    
    
Review of Systems    
    
    
ROS      
    
 Status of ROS 10 or more systems reviewed and unremarkable except as noted in   
history and below       
    
    
PFSH    
PFS    
Social History    
Smoking status:  Former smoker     
    
    
    
Exam    
Constitutional    
Vital Signs, click to edit/add:     
    
                                Last Vital Signs    
    
    
    
Temp  97.6 F   08/26/23 04:20    
     
Pulse  115 H  08/26/23 06:20    
     
Resp  14   08/26/23 06:20    
     
BP  152/114 H  08/26/23 06:10    
     
Pulse Ox  99   08/26/23 05:10    
     
O2 Del Method  Room Air  08/26/23 04:20    
    
    
    
    
Common normals: no apparent distress, average body habitus, oriented x3, no   
limitations, healthy appearing and alert    
Eye    
Common normals: EOMs intact bilaterally, conjunctivae normal and no scleral   
icterus    
Respiratory    
Common normals: normal respiratory effort, no retractions, no use of accessory   
muscles and clear to auscultation bilaterally    
Cardio    
Rate: tachycardic    
GI    
Common normals: Normal to inspection, nondistended, normoactive bowel sounds   
present, soft to palpation and non-tender    
Extremity    
Common normals: normal to inspection and full ROM    
Neuro    
Common normals: oriented x3, CN's II-XII intact bilaterally, moves all   
extremities and no focal motor deficits    
Psych    
Appearance: grossly normal    
    
Course    
Vital Signs    
Vital signs:     
    
                                   Vital Signs    
    
    
    
Temperature  97.6 F   08/26/23 04:20    
     
Pulse Rate  119 H  08/26/23 04:20    
     
Respiratory Rate  16   08/26/23 04:20    
     
Blood Pressure  150/110 H  08/26/23 04:20    
     
Pulse Oximetry  98   08/26/23 04:20    
     
Oxygen Delivery Method  Room Air  08/26/23 04:20    
    
    
                                            
    
    
    
Temperature  97.6 F   08/26/23 04:20    
     
Pulse Rate  115 H  08/26/23 06:20    
     
Respiratory Rate  14   08/26/23 06:20    
     
Blood Pressure  152/114 H  08/26/23 06:10    
     
Pulse Oximetry  99   08/26/23 05:10    
     
Oxygen Delivery Method  Room Air  08/26/23 04:20    
    
    
    
    
    
MDM - Chest Pain    
MDM Narrative    
Medical decision making narrative:     
past history of HTN for which he takes Amlodipine, Cozaar and and Coreg. Tonight  
developed chest tightness and light headiness after exposure to fumes from   
Galvanized fumes. chest tightness resolved by the time he arrived here but he   
still felt light headed. Monitor found him tachycardic. EKG with undetermined   
narrow complex tachycardic rhythm. D-dimer and Troponin neg. Cxray clear.   
Treated with metoprolol IV and his heart rate slowed down but he was still tachy  
at 114 BPM. He is feeling better and no longer feels light headed. Repeat EKG   
continues with undetermined narrow complex tachycardia. Patient informed of the   
plan to obtain old EKG from another hospital. States he was at a [facility]  
about 2 months ago. Patient also informed of the abnormal EKG and rhythm and   
this tachycardia can explain his complaint of light headiness . He did not want   
to wait for the EKG from [place]. His BP was elevated at 150s systolic and did not   
change. He was advised to wait at least for the old EKG so I could better   
determine his status. He refused to wait and signed out AMA. Advised he can   
return if he changes his mind.     
Lab Data    
Labs:     
    
                                   Lab Results    
    
    
    
  08/26/23 Range/Units    
    
  04:30     
     
WBC  7.8  (4.0-11.0)  10^3/uL    
     
RBC  4.98  (4.70-6.10)  10^6/uL    
     
Hgb  15.8  (14.0-18.0)  g/dL    
     
Hct  44.4  (42.0-54.0)  %    
     
MCV  89.2  (80.0-94.0)  fL    
     
MCH  31.7  (25.9-34.0)  pg    
     
MCHC  35.6 H  (29.9-35.2)  g/dL    
     
RDW  12.3  (11.0-15.0)  %    
     
Plt Count  365  (150-450)  10^3/uL    
     
MPV  8.7 L  (9.5-13.5)  fL    
     
Neut % (Auto)  50.9  (43.0-75.0)  %    
     
Lymph % (Auto)  37.3  (20.5-60.0)  %    
     
Mono % (Auto)  10.1  (1.7-12.0)  %    
     
Eos % (Auto)  0.8 L  (0.9-7.0)  %    
     
Baso % (Auto)  0.8  (0.2-2.0)  %    
     
Neut # (Auto)  4.0  (1.4-6.5)  10^3/uL    
     
Lymph # (Auto)  2.9  (1.2-3.8)  10^3/uL    
     
Mono # (Auto)  0.8  (0.3-0.8)  10^3/uL    
     
Eos # (Auto)  0.1  (0.0-0.7)  10^3/uL    
     
Baso # (Auto)  0.1  (0.0-0.1)  10^3/uL    
     
Abs Immat Gran (auto)  0.01  (0.00-0.03)  10^3/uL    
     
Imm/Tot Granulo (auto)  0.1  (0.0-0.5)  %    
     
D-Dimer  0.19  (<=0.59)  mg/L FEU    
     
Sodium  139  (136-145)  mmol/L    
     
Potassium  3.6  (3.5-5.1)  mmol/L    
     
Chloride  104  ()  mmol/L    
     
Carbon Dioxide  25.5  (21.0-32.0)  mmol/L    
     
Anion Gap  13.1      
     
BUN  15.0  (7.0-18.0)  mg/dL    
     
Creatinine  1.06  (0.70-1.30)  mg/dL    
     
Est GFR ( Amer)  >60  (>=60)      
     
Est GFR (Non-Af Amer)  >60  (>=60)      
     
BUN/Creatinine Ratio  14.2      
     
Glucose  91  ()  mg/dL    
     
Calcium  9.4  (8.5-10.1)  mg/dL    
     
Troponin I High Sens  6.0  (4.0-76.1)  pg/mL    
    
    
    
    
    
Discharge Plan    
Discharge    
Chief Complaint: Chest Pain    
    
Clinical Impression:    
 Light-headedness, Tachycardia, Chest pain, Hypertension    
    
    
Patient Disposition: Left Against Medical Advice    
    
Mode of Transportation: Private Vehicle    
    
Prescriptions / Home Meds:    
No Action    
  carvedilol 6.25 mg tablet     
   6.25 mg PO Q12H     
  amlodipine 2.5 mg tablet     
   2.5 mg PO DAILY     
  losartan 100 mg tablet     
   100 mg PO DAILY     
    
Stand Alone Forms:  Portal Instructions    
    
Referrals:    
Physician,Non-Staff, MD [Primary Care Provider] - 1 week    
    
Discharge Date/Time: 08/26/23 06:30

## 2023-08-26 NOTE — XR_ITS
The [address] [address] 
     [phone] 
  
  
Patient Name: 
WESLY WALL 
  
MRN: [id number]    YOB: 1975    Sex: M 
Assigned Patient Location: ED.MAIN 
Current Patient Location: ER 
Accession/Order Number: [id number] 
Exam Date: 8/26/2023  04:47    Report Date: 8/26/2023  05:32 
  
At the request of: 
MIGNON SANDOVAL   
  
Procedure:  XR chest 1V 
  
EXAMINATION: XR chest 1V  
  
HISTORY: chest pain 
  
COMPARISON: No relevant comparison available.  
  
TECHNIQUE: AP portable erect 
  
FINDINGS: 
LUNGS: No significant pulmonary parenchymal abnormalities. 
VASCULATURE: No increased pulmonary vasculature.  
PLEURA: No pneumothorax, effusion, or pleural thickening.  
CARDIAC: No cardiomegaly or cardiac silhouette abnormality.  
MEDIASTINUM: No visible mass or adenopathy.  
BONES: No fracture or visible bone lesion.  
OTHER: Negative.  
  
ORDER #: [id number] XR/XR chest 1V  
IMPRESSION:   
   
No acute cardiopulmonary process  
   
   
Electronically authenticated by: SWEETIE GALINDO   Date: 8/26/2023  05:32

## 2023-08-26 NOTE — ED.CHESTPAI1
HPI - Chest Pain
General
Chief Complaint: Chest Pain
Stated Complaint: CHEST PAIN
Time Seen by Provider: 08/26/23 04:33
Source: patient
Mode of arrival: walk-in
Limitations: no limitations
History of Present Illness
HPI narrative: 
history of hypertension. Tonight at work exposed to galvanized steel fumes.  States a co worker was welding it and he could smell it. He developed chest tightness and felt light headed. Went to the break room and his chest tightness improved but he 
continued to feel light headed and did not feel safe driving .His supervisor drove him here.
MD complaint: Reports chest heaviness
Related Data
Home Medications

 Medication  Instructions  Recorded  Confirmed
amlodipine 2.5 mg tablet 2.5 mg PO DAILY 08/26/23 08/26/23
carvedilol 6.25 mg tablet 6.25 mg PO Q12H 08/26/23 08/26/23
losartan 100 mg tablet 100 mg PO DAILY 08/26/23 08/26/23


Allergies

Allergy/AdvReac Type Severity Reaction Status Date / Time
No Known Drug Allergies Allergy   Verified 08/26/23 04:26



Review of Systems
ROS  
 Status of ROS 10 or more systems reviewed and unremarkable except as noted in history and below   

PFSH
PFS
Social History
Smoking status:  Former smoker 



Exam
Constitutional
Vital Signs, click to edit/add: 

Last Vital Signs

Temp  97.6 F   08/26/23 04:20
Pulse  115 H  08/26/23 06:20
Resp  14   08/26/23 06:20
BP  152/114 H  08/26/23 06:10
Pulse Ox  99   08/26/23 05:10
O2 Del Method  Room Air  08/26/23 04:20



Common normals: no apparent distress, average body habitus, oriented x3, no limitations, healthy appearing and alert
Eye
Common normals: EOMs intact bilaterally, conjunctivae normal and no scleral icterus
Respiratory
Common normals: normal respiratory effort, no retractions, no use of accessory muscles and clear to auscultation bilaterally
Cardio
Rate: tachycardic
GI
Common normals: Normal to inspection, nondistended, normoactive bowel sounds present, soft to palpation and non-tender
Extremity
Common normals: normal to inspection and full ROM
Neuro
Common normals: oriented x3, CN's II-XII intact bilaterally, moves all extremities and no focal motor deficits
Psych
Appearance: grossly normal

Course
Vital Signs
Vital signs: 

Vital Signs

Temperature  97.6 F   08/26/23 04:20
Pulse Rate  119 H  08/26/23 04:20
Respiratory Rate  16   08/26/23 04:20
Blood Pressure  150/110 H  08/26/23 04:20
Pulse Oximetry  98   08/26/23 04:20
Oxygen Delivery Method  Room Air  08/26/23 04:20



Temperature  97.6 F   08/26/23 04:20
Pulse Rate  115 H  08/26/23 06:20
Respiratory Rate  14   08/26/23 06:20
Blood Pressure  152/114 H  08/26/23 06:10
Pulse Oximetry  99   08/26/23 05:10
Oxygen Delivery Method  Room Air  08/26/23 04:20




MDM - Chest Pain
MDM Narrative
Medical decision making narrative: 
past history of HTN for which he takes Amlodipine, Cozaar and and Coreg. Tonight developed chest tightness and light headiness after exposure to fumes from Galvanized fumes. chest tightness resolved by the time he arrived here but he still felt 
light headed. Monitor found him tachycardic. EKG with undetermined narrow complex tachycardic rhythm. D-dimer and Troponin neg. Cxray clear. Treated with metoprolol IV and his heart rate slowed down but he was still tachy at 114 BPM. He is feeling 
better and no longer feels light headed. Repeat EKG continues with undetermined narrow complex tachycardia. Patient informed of the plan to obtain old EKG from another hospital. States he was at a [facility] about 2 months ago. Patient also 
informed of the abnormal EKG and rhythm and this tachycardia can explain his complaint of light headiness . He did not want to wait for the EKG from [place]. His BP was elevated at 150s systolic and did not change. He was advised to wait at least for the 
old EKG so I could better determine his status. He refused to wait and signed out AMA. Advised he can return if he changes his mind. 
Lab Data
Labs: 

Lab Results

  08/26/23 Range/Units
  04:30 
WBC  7.8  (4.0-11.0)  10^3/uL
RBC  4.98  (4.70-6.10)  10^6/uL
Hgb  15.8  (14.0-18.0)  g/dL
Hct  44.4  (42.0-54.0)  %
MCV  89.2  (80.0-94.0)  fL
MCH  31.7  (25.9-34.0)  pg
MCHC  35.6 H  (29.9-35.2)  g/dL
RDW  12.3  (11.0-15.0)  %
Plt Count  365  (150-450)  10^3/uL
MPV  8.7 L  (9.5-13.5)  fL
Neut % (Auto)  50.9  (43.0-75.0)  %
Lymph % (Auto)  37.3  (20.5-60.0)  %
Mono % (Auto)  10.1  (1.7-12.0)  %
Eos % (Auto)  0.8 L  (0.9-7.0)  %
Baso % (Auto)  0.8  (0.2-2.0)  %
Neut # (Auto)  4.0  (1.4-6.5)  10^3/uL
Lymph # (Auto)  2.9  (1.2-3.8)  10^3/uL
Mono # (Auto)  0.8  (0.3-0.8)  10^3/uL
Eos # (Auto)  0.1  (0.0-0.7)  10^3/uL
Baso # (Auto)  0.1  (0.0-0.1)  10^3/uL
Abs Immat Gran (auto)  0.01  (0.00-0.03)  10^3/uL
Imm/Tot Granulo (auto)  0.1  (0.0-0.5)  %
D-Dimer  0.19  (<=0.59)  mg/L FEU
Sodium  139  (136-145)  mmol/L
Potassium  3.6  (3.5-5.1)  mmol/L
Chloride  104  ()  mmol/L
Carbon Dioxide  25.5  (21.0-32.0)  mmol/L
Anion Gap  13.1  
BUN  15.0  (7.0-18.0)  mg/dL
Creatinine  1.06  (0.70-1.30)  mg/dL
Est GFR ( Amer)  >60  (>=60)  
Est GFR (Non-Af Amer)  >60  (>=60)  
BUN/Creatinine Ratio  14.2  
Glucose  91  ()  mg/dL
Calcium  9.4  (8.5-10.1)  mg/dL
Troponin I High Sens  6.0  (4.0-76.1)  pg/mL




Discharge Plan
Discharge
Chief Complaint: Chest Pain

Clinical Impression:
 Light-headedness, Tachycardia, Chest pain, Hypertension


Patient Disposition: Left Against Medical Advice

Mode of Transportation: Private Vehicle

Prescriptions / Home Meds:
No Action
  carvedilol 6.25 mg tablet 
   6.25 mg PO Q12H 
  amlodipine 2.5 mg tablet 
   2.5 mg PO DAILY 
  losartan 100 mg tablet 
   100 mg PO DAILY 

Stand Alone Forms:  Portal Instructions

Referrals:
Physician,Non-Staff, MD [Primary Care Provider] - 1 week

Discharge Date/Time: 08/26/23 06:30

## 2023-08-26 NOTE — ECG_ITS
[facility] 
                                        
                                       Test Date:    2023 
Pat Name:     WESLY WALL            Department:    
Patient ID:   [id number]               Room:         - 
Gender:       Male                     Technician:    
:          [id number]               Requested By: [id number] 
Order Number: [id number]              Reading MD:   MG BAH 
                                 Measurements 
Intervals                              Axis           
Rate:         119                      P:            [id number] 
FL:           [id number]                   QRS:          48 
QRSD:         86                       T:            60 
QT:           332                                     
QTc:          403                                     
                           Interpretive Statements 
1420 Undetermined rhythm (Possible supraventricular tachycardia) 
15580 ST depression, possible digitalis effect 
9150 **  abnormal ECG  ** 
No previous ECG available for comparison 
Electronically Signed On 2023 18:07:56 EDT by MG BAH

## 2023-08-26 NOTE — ECG_ITS
[facility] 
                                        
                                       Test Date:    2023 
Pat Name:     WESLY WALL            Department:    
Patient ID:   [id number]               Room:         - 
Gender:       Male                     Technician:    
:          [id number]               Requested By: [id number] 
Order Number: [id number]              Reading MD:   MG BAH 
                                 Measurements 
Intervals                              Axis           
Rate:         114                      P:            [id number] 
RI:           [id number]                   QRS:          40 
QRSD:         84                       T:            54 
QT:           330                                     
QTc:          397                                     
                           Interpretive Statements 
1420 Undetermined rhythm (Possible supraventricular tachycardia) 
65945 ST depression, possible digitalis effect 
9150 **  abnormal ECG  ** 
Compared to ECG 2023 04:25:19 
No significant changes 
Electronically Signed On 2023 18:08:17 EDT by MG BAH